# Patient Record
Sex: FEMALE | Race: BLACK OR AFRICAN AMERICAN | URBAN - METROPOLITAN AREA
[De-identification: names, ages, dates, MRNs, and addresses within clinical notes are randomized per-mention and may not be internally consistent; named-entity substitution may affect disease eponyms.]

---

## 2017-06-14 DIAGNOSIS — D50.0 IRON DEFICIENCY ANEMIA DUE TO CHRONIC BLOOD LOSS: Primary | ICD-10-CM

## 2017-06-16 PROBLEM — D50.0 IRON DEFICIENCY ANEMIA DUE TO CHRONIC BLOOD LOSS: Status: ACTIVE | Noted: 2017-06-16

## 2017-06-16 PROBLEM — K90.9 UNSPECIFIED INTESTINAL MALABSORPTION: Status: ACTIVE | Noted: 2017-06-16

## 2017-06-17 ENCOUNTER — HOSPITAL ENCOUNTER (OUTPATIENT)
Dept: OTHER | Age: 57
Discharge: HOME | End: 2017-06-17
Attending: INTERNAL MEDICINE | Admitting: INTERNAL MEDICINE
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
## 2018-07-30 LAB
BASOPHILS ABSOLUTE: 0 K/UL (ref 0–0.2)
BASOPHILS RELATIVE PERCENT: 0.1 %
EOSINOPHILS ABSOLUTE: 0.1 K/UL (ref 0–0.6)
EOSINOPHILS RELATIVE PERCENT: 1.5 %
HCT VFR BLD CALC: 41.9 % (ref 36–48)
HEMOGLOBIN: 13.8 G/DL (ref 12–16)
LYMPHOCYTES ABSOLUTE: 2 K/UL (ref 1–5.1)
LYMPHOCYTES RELATIVE PERCENT: 47.6 %
MCH RBC QN AUTO: 27 PG (ref 26–34)
MCHC RBC AUTO-ENTMCNC: 32.9 G/DL (ref 31–36)
MCV RBC AUTO: 82.2 FL (ref 80–100)
MONOCYTES ABSOLUTE: 0.4 K/UL (ref 0–1.3)
MONOCYTES RELATIVE PERCENT: 9.9 %
NEUTROPHILS ABSOLUTE: 1.7 K/UL (ref 1.7–7.7)
NEUTROPHILS RELATIVE PERCENT: 40.9 %
PDW BLD-RTO: 13 % (ref 12.4–15.4)
PLATELET # BLD: 221 K/UL (ref 135–450)
PMV BLD AUTO: 9.9 FL (ref 5–10.5)
RBC # BLD: 5.1 M/UL (ref 4–5.2)
TSH SERPL DL<=0.05 MIU/L-ACNC: 1.25 UIU/ML (ref 0.27–4.2)
WBC # BLD: 4.1 K/UL (ref 4–11)

## 2020-02-05 ENCOUNTER — HOSPITAL ENCOUNTER (OUTPATIENT)
Dept: WOMENS IMAGING | Age: 60
Discharge: HOME | End: 2020-02-05
Payer: MEDICARE

## 2020-02-05 PROCEDURE — 77067 SCR MAMMO BI INCL CAD: CPT

## 2021-02-11 ENCOUNTER — TELEPHONE (OUTPATIENT)
Dept: ENT CLINIC | Age: 61
End: 2021-02-11

## 2021-02-11 NOTE — TELEPHONE ENCOUNTER
[phone] (home) [phone] (work)   Called patient about referral from Dr. Johanna Trejo. No answer. Was unable to leave a VM.